# Patient Record
Sex: MALE | Race: WHITE
[De-identification: names, ages, dates, MRNs, and addresses within clinical notes are randomized per-mention and may not be internally consistent; named-entity substitution may affect disease eponyms.]

---

## 2019-11-25 ENCOUNTER — HOSPITAL ENCOUNTER (INPATIENT)
Dept: HOSPITAL 74 - JSAMEDAYSX | Age: 49
LOS: 9 days | Discharge: LEFT BEFORE DISCHARGE | DRG: 304 | End: 2019-12-04
Attending: ORTHOPAEDIC SURGERY | Admitting: ORTHOPAEDIC SURGERY
Payer: COMMERCIAL

## 2019-11-25 VITALS — BODY MASS INDEX: 25.8 KG/M2
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
## 2019-11-25 DIAGNOSIS — I97.621: ICD-10-CM

## 2019-11-25 DIAGNOSIS — M51.27: Primary | ICD-10-CM

## 2019-11-25 DIAGNOSIS — I95.9: ICD-10-CM

## 2019-11-25 DIAGNOSIS — R33.9: ICD-10-CM

## 2019-11-25 DIAGNOSIS — M53.2X7: ICD-10-CM

## 2019-11-25 DIAGNOSIS — I10: ICD-10-CM

## 2019-11-25 PROCEDURE — 0SB40ZZ EXCISION OF LUMBOSACRAL DISC, OPEN APPROACH: ICD-10-PCS | Performed by: ORTHOPAEDIC SURGERY

## 2019-11-25 PROCEDURE — 0SG3071 FUSION OF LUMBOSACRAL JOINT WITH AUTOLOGOUS TISSUE SUBSTITUTE, POSTERIOR APPROACH, POSTERIOR COLUMN, OPEN APPROACH: ICD-10-PCS | Performed by: ORTHOPAEDIC SURGERY

## 2019-11-25 PROCEDURE — 00NY0ZZ RELEASE LUMBAR SPINAL CORD, OPEN APPROACH: ICD-10-PCS | Performed by: ORTHOPAEDIC SURGERY

## 2019-11-25 PROCEDURE — 07DR3ZZ EXTRACTION OF ILIAC BONE MARROW, PERCUTANEOUS APPROACH: ICD-10-PCS | Performed by: ORTHOPAEDIC SURGERY

## 2019-11-25 PROCEDURE — 0SG30AJ FUSION OF LUMBOSACRAL JOINT WITH INTERBODY FUSION DEVICE, POSTERIOR APPROACH, ANTERIOR COLUMN, OPEN APPROACH: ICD-10-PCS | Performed by: ORTHOPAEDIC SURGERY

## 2019-11-25 RX ADMIN — GABAPENTIN SCH MG: 300 CAPSULE ORAL at 23:10

## 2019-11-25 RX ADMIN — ACETAMINOPHEN SCH MG: 10 INJECTION, SOLUTION INTRAVENOUS at 20:00

## 2019-11-25 RX ADMIN — SODIUM CHLORIDE, POTASSIUM CHLORIDE, SODIUM LACTATE AND CALCIUM CHLORIDE SCH MLS/HR: 600; 310; 30; 20 INJECTION, SOLUTION INTRAVENOUS at 18:15

## 2019-11-25 RX ADMIN — CEFAZOLIN SCH MLS/HR: 1 INJECTION, POWDER, FOR SOLUTION INTRAVENOUS at 23:11

## 2019-11-25 RX ADMIN — SODIUM CHLORIDE, POTASSIUM CHLORIDE, SODIUM LACTATE AND CALCIUM CHLORIDE SCH MLS/HR: 600; 310; 30; 20 INJECTION, SOLUTION INTRAVENOUS at 23:13

## 2019-11-25 NOTE — SURG
Surgery First Assist Note


First Assist: Myles Moise PA-C


Date of Service: 11/25/19


Diagnosis: 





1. 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  


2. L5-S1 spinal stenosis with neurogenic claudication.  


3. L5-S1 axial instability with myofascial pain complex.  Severity of illness: 4





Procedure: 





1. Bilateral laminectomies.  


2. Bilateral L5, S1 osteotomies (facetectomies).  


3. L5-S1 discectomy.  


4. L5-S1 posterior lumbar interbody fusion.  


5. L5-S1 insertion biomechanical device.  


6. L5-S1 posterior instrumentation.  


7. L5-S1 posterolateral arthrodesis.  


8. Bone allograft.  9. Bone autograft.  


10. Bone marrow aspiration.  


11. Complex wound closure (20cm).


I was present for the entirety of the operative procedure. For further detail, 

please refer to operative report.








Visit type





- Case Type


Case Type: Scheduled





- New patient


This patient is new to me today: Yes


Date on this admission: 11/25/19

## 2019-11-25 NOTE — OP
DATE OF OPERATION:  

 

DATE OF DICTATION:  11/25/2019

 

SURGEON:  Óscar Barber M.D. 

 

ASSISTANT:  KAYODE Khan. 

 

PREOPERATIVE DIAGNOSIS:  Disk prolapse, L5-S1, with radicular myelopathy and

segmental instability.  

 

POSTOPERATIVE DIAGNOSIS:  Disk prolapse, L5-S1, with radicular myelopathy and

segmental instability.  

 

OPERATION PERFORMED:

1.  Bilateral laminectomy, L5.  

2.  Partial laminectomy, L4 and S1.  

3.  Posterior lumbar interbody fusion with cage, L5-S1. 

4.  Anterior arthrodesis with bone grafting L5-S1.

5.  Pedicle screw instrumentation, L5-S1.

6.  Posterior lateral arthrodesis, L5-S1.

7.  Use of autologous bone graft expanded with allograft and bone marrow aspirate

concentrate. 

8.  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

 

ANESTHESIA:  RADHA Klein MD [id number]

DD: 11/25/2019 18:55

DT: 11/25/2019 22:07

Job #:  [id number]

## 2019-11-25 NOTE — PN
Progress Note (short form)





- Note


Progress Note: 





49M s/p L5-S1 posterior lumbar interbody fusion with instrumented postero-

lateral arthrodesis POD #0.





-Admit to ICU post-op.


-Pain control: NO NSAID's; patient received pre-op TLIP block w/Exparel; OK to 

use PCA if needed; transition to oral analgesia post-op.


-DVT PPx:


   -Mechanical only: CARRIE's, SCD's.


   -Chemical: None.


-Incentive spirometry q15 min.


-NPO until flatus.


-Ackerman care; d/c when ambulating.


-Post-op Ancef x 3 doses.


-PT/OT/Rehab, OOB.


-WBAT B/L LE.


-No bending, lifting (>5 lbs), or twisting for 9-12 months.


-Care per ICU & medical hospitalist teams.


-Discharge planning: f/u 7-10 days after rehab discharge at [facility] office; call for appointment; [phone].


-Will follow.





Óscar Barber MD (Orthopaedic Surgery).

## 2019-11-25 NOTE — OP
Operative Note





- Note:


Operative Date: 11/25/19


Pre-Operative Diagnosis: 1. L5-S1 intervertebral disc disorder with lower 

extremity radiculopathy.  2. L5-S1 spinal stenosis with neurogenic 

claudication.  3. L5-S1 axial instability with myofascial pain complex.  

Severity of illness: 4


Operation: 1. Bilateral laminectomies.  2. Bilateral L5, S1 osteotomies (

facetectomies).  3. L5-S1 discectomy.  4. L5-S1 posterior lumbar interbody 

fusion.  5. L5-S1 insertion biomechanical device.  6. L5-S1 posterior 

instrumentation.  7. L5-S1 posterolateral arthrodesis.  8. Bone allograft.  9. 

Bone autograft.  10. Bone marrow aspiration.  11. Complex wound closure (20cm).


Findings: 





R L5 conjoined nerve root


Implants: L5-S1 Cage: 11mm [id number].  Screws: 6.5mm x 40mm screws x 4


Post-Operative Diagnosis: Same as Pre-op


Surgeon: Óscar Barber


Assistant: Myles Moise


Anesthesiologist/CRNA: Zo Muir


Anesthesia: General


Specimens Removed: L5-S1 disc


Estimated Blood Loss (mls): 300


Drains & Tubes with Location: 1 x superficial HemoVac


Blood Volume Replaced (mls): 125 (Cell Saver)


Fluid Volume Replaced (mls): 2,000 (Crystalloid)


Operative Report Dictated: Yes

## 2019-11-26 LAB
ANION GAP SERPL CALC-SCNC: 7 MMOL/L (ref 8–16)
BUN SERPL-MCNC: 18.5 MG/DL (ref 7–18)
CALCIUM SERPL-MCNC: 8.8 MG/DL (ref 8.5–10.1)
CHLORIDE SERPL-SCNC: 106 MMOL/L (ref 98–107)
CO2 SERPL-SCNC: 25 MMOL/L (ref 21–32)
CREAT SERPL-MCNC: 1 MG/DL (ref 0.55–1.3)
DEPRECATED RDW RBC AUTO: 12.5 % (ref 11.9–15.9)
GLUCOSE SERPL-MCNC: 105 MG/DL (ref 74–106)
HCT VFR BLD CALC: 35.3 % (ref 35.4–49)
HGB BLD-MCNC: 12.2 GM/DL (ref 11.7–16.9)
MCH RBC QN AUTO: 32.6 PG (ref 25.7–33.7)
MCHC RBC AUTO-ENTMCNC: 34.5 G/DL (ref 32–35.9)
MCV RBC: 94.7 FL (ref 80–96)
PLATELET # BLD AUTO: 228 K/MM3 (ref 134–434)
PMV BLD: 8.4 FL (ref 7.5–11.1)
POTASSIUM SERPLBLD-SCNC: 4 MMOL/L (ref 3.5–5.1)
RBC # BLD AUTO: 3.72 M/MM3 (ref 4–5.6)
SODIUM SERPL-SCNC: 138 MMOL/L (ref 136–145)
WBC # BLD AUTO: 16.4 K/MM3 (ref 4–10)

## 2019-11-26 RX ADMIN — SODIUM CHLORIDE, POTASSIUM CHLORIDE, SODIUM LACTATE AND CALCIUM CHLORIDE SCH: 600; 310; 30; 20 INJECTION, SOLUTION INTRAVENOUS at 18:25

## 2019-11-26 RX ADMIN — CEFAZOLIN SCH MLS/HR: 1 INJECTION, POWDER, FOR SOLUTION INTRAVENOUS at 06:22

## 2019-11-26 RX ADMIN — CEFAZOLIN SCH MLS/HR: 1 INJECTION, POWDER, FOR SOLUTION INTRAVENOUS at 17:18

## 2019-11-26 RX ADMIN — ACETAMINOPHEN SCH MG: 10 INJECTION, SOLUTION INTRAVENOUS at 06:21

## 2019-11-26 RX ADMIN — SODIUM CHLORIDE, POTASSIUM CHLORIDE, SODIUM LACTATE AND CALCIUM CHLORIDE SCH MLS/HR: 600; 310; 30; 20 INJECTION, SOLUTION INTRAVENOUS at 15:38

## 2019-11-26 RX ADMIN — CEFAZOLIN SCH MLS/HR: 1 INJECTION, POWDER, FOR SOLUTION INTRAVENOUS at 11:52

## 2019-11-26 RX ADMIN — SODIUM CHLORIDE, POTASSIUM CHLORIDE, SODIUM LACTATE AND CALCIUM CHLORIDE SCH: 600; 310; 30; 20 INJECTION, SOLUTION INTRAVENOUS at 21:36

## 2019-11-26 RX ADMIN — GABAPENTIN SCH MG: 300 CAPSULE ORAL at 10:23

## 2019-11-26 RX ADMIN — CEFAZOLIN SCH: 1 INJECTION, POWDER, FOR SOLUTION INTRAVENOUS at 06:30

## 2019-11-26 RX ADMIN — GABAPENTIN SCH MG: 300 CAPSULE ORAL at 21:46

## 2019-11-26 RX ADMIN — SODIUM CHLORIDE, POTASSIUM CHLORIDE, SODIUM LACTATE AND CALCIUM CHLORIDE SCH: 600; 310; 30; 20 INJECTION, SOLUTION INTRAVENOUS at 01:40

## 2019-11-26 NOTE — PN
Progress Note (short form)





- Note


Progress Note: 


Anesthesia Post Op Note


Pt seen s/p GA for  lumbar lami


Pt awake alert in NAD, denies nausea/vomiting


denies puritis 


lama in situ to ambulate this am


pain control acceptable


VSS


no apparent anesthesia complications


BRICE Ladd.

## 2019-11-26 NOTE — PN
Progress Note (short form)





- Note


Progress Note: 











POD 1, s/p Bilateral L5, S1 Lami/osteotomies/discectomy/fusion.  











Pt seen and examined. Reports lower back pain radiating into his L thigh (sxs 

prior to sx), slightly improved with pain regimen. Tolerating PO. Has been oob 

standing, however reports pain is severe with movement. Ackerman in place. Denies 

cp/sob, n/v/d, motor/sensory deficits. 














 Vital Signs











Temp  99.2 F   11/26/19 06:00


 


Pulse  82   11/26/19 06:00


 


Resp  20   11/26/19 06:00


 


BP  102/46 L  11/26/19 06:00


 


Pulse Ox  96   11/25/19 20:45








 Intake & Output











 11/25/19 11/25/19 11/26/19





 11:59 23:59 11:59


 


Intake Total  2525 325


 


Output Total  1450 600


 


Balance  1075 -275


 


Weight 175 lb  


 


Intake:   


 


  IV  2400 225


 


    Lactated Ringers Solution   225





    1,000 ml @ 75 mls/hr IV   





    ASDIR KERLINE Rx#:[id number]   


 


  IVPB   100


 


  Blood Product  125 


 


Output:   


 


  Urine  1100 600


 


    Ackerman   600


 


  Estimated Blood Loss  350 


 


Other:   


 


  Voiding Method  Indwelling Catheter 


 


  Bowel Movement   No


 


  Height 5 ft 9 in  


 


  Body Mass Index (BMI) 25.8  


 


  Weight Measurement Method Stated by Patient  








 CBC, BMP





 11/26/19 07:10 





 11/26/19 07:10 














Gen: awake, alert, nad


Resp: Unlabored on RA


Neuro: B/L le 5/5 dorsi/plantarflexion, SILT b/l les. Unable to lift legs 

antigravity due to pain, however visualized pt standing at edge of bed without 

issue. 


Back: Dressing saturated with blood, removed incision c/d/i with staples in 

place, no active drainage visualized. New 4x4s and tegaderms applied. 








A/P: 50 y/o M w/ HTN, Back pain/radiculopathy, now POD 1, s/p bilateral L5, S1 

Lami/osteotomies/discectomy/fusion.  








afebrile, slightly hypotensive overnight (systolic 90s)








-Pain control with Oxy 5/10mg q4hrs prn, ofirmev 1G q6hrs scheduled, gabapentin 

600mg BID


-OOB with PT


-Ackerman to be d/c'ed at 1400


-VS per protocol


-Neuro checks per protocol


-Keep dressing c/d/i


-Reg diet





message sent to Dr Barber regarding above

## 2019-11-27 RX ADMIN — GABAPENTIN SCH MG: 300 CAPSULE ORAL at 21:21

## 2019-11-27 RX ADMIN — GABAPENTIN SCH MG: 300 CAPSULE ORAL at 09:33

## 2019-11-27 RX ADMIN — SODIUM CHLORIDE, POTASSIUM CHLORIDE, SODIUM LACTATE AND CALCIUM CHLORIDE SCH: 600; 310; 30; 20 INJECTION, SOLUTION INTRAVENOUS at 21:22

## 2019-11-27 RX ADMIN — LISINOPRIL SCH MG: 5 TABLET ORAL at 09:34

## 2019-11-27 RX ADMIN — SODIUM CHLORIDE, POTASSIUM CHLORIDE, SODIUM LACTATE AND CALCIUM CHLORIDE SCH MLS/HR: 600; 310; 30; 20 INJECTION, SOLUTION INTRAVENOUS at 02:37

## 2019-11-27 NOTE — PN
Progress Note (short form)





- Note


Progress Note: 





ORTHOPAEDIC SPINE SURGERY





POD #2 s/p Bilateral L5, S1 Lami/osteotomies/discectomy/fusion.  








Pt seen and examined at bedside.


States only OOB and able to stand for a short bit (secondary to significant 

pain related to surgical procedure)


He is tolerating PO diet. Lama in place. Denies n/v/f/c, cp, palpitations, sob 

or zhao. Denies motor or sensory deficits. 





 Last Vital Signs











Temp Pulse Resp BP Pulse Ox


 


 98.8 F   88   20   134/60   92 L


 


 11/27/19 07:00  11/27/19 07:00  11/27/19 07:00  11/27/19 07:00  11/26/19 21:00














PE


Gen: awake, alert, nad


Resp: Unlabored on RA


Neuro: B/L le 5/5 dorsi/plantarflexion, SILT b/l les


Back: c/d/i with staples insitu, no hematoma/erythema or warmth (no signs of 

infection). 

















Problem List





- Problems


(1) Chronic low back pain


Assessment/Plan: 


A/P: 50 y/o M POD #2 s/p Bilateral L5, S1 Lami/osteotomies/discectomy/fusion. 








-Pain control with Oxy 5/10mg q4hrs prn, ofirmev 1G q6hrs scheduled, gabapentin 

600mg BID


-OOB with PT


-DC lama once OOB and able to ambulate


-VS per protocol


-Neuro checks per protocol


-Reg diet


-Dressing changed on rounds


-DVT ppx


Code(s): M54.5 - LOW BACK PAIN; G89.29 - OTHER CHRONIC PAIN

## 2019-11-27 NOTE — PN
Progress Note (short form)





- Note


Progress Note: 





49M s/p L5-S1 posterior lumbar interbody fusion with instrumented postero-

lateral arthrodesis POD #2.





Pain well controlled; Original left leg pain completely resolved. No acute 

events overnight.


Pt. denies overnight history of headaches, chest pain, shortness of breath, 

nausea, vomiting, chills, & sweats.


(+) Ackerman; (-) Flatus; (-) BM.





Walked in hallway.





All labs and vitals reviewed.





PE: AAO x 3, NAD.


T/L-Spine: Incision, dressing C/D/I.


   B/L LE M: L2-S1 intact, at baseline.


   B/L LE S: L2-S1 2/2.





49M s/p L5-S1 posterior lumbar interbody fusion with instrumented postero-

lateral arthrodesis POD #2.


-Admit to ICU post-op.


-Pain control: NO NSAID's.


-DVT PPx:


   -Mechanical only: CARRIE's, SCD's.


   -Chemical: None.


-Incentive spirometry q15 min.


-NPO until flatus.


-Ackerman care; d/c when ambulating.


-PT/OT/Rehab, OOB.


-WBAT B/L LE.


-No bending, lifting (>5 lbs), or twisting for 9-12 months.


-Care per medical hospitalist teams.


-f/u pain management rec's: Dr. Brennon Theodore (case discussed).


-Discharge planning: f/u 7-10 days after rehab discharge at [facility] office; call for appointment; [phone].


-Will follow.





Óscar Barber MD (Orthopaedic Surgery).

## 2019-11-28 RX ADMIN — OXYCODONE HYDROCHLORIDE SCH MG: 10 TABLET, FILM COATED, EXTENDED RELEASE ORAL at 12:16

## 2019-11-28 RX ADMIN — ACETAMINOPHEN PRN MG: 325 TABLET ORAL at 17:36

## 2019-11-28 RX ADMIN — GABAPENTIN SCH MG: 300 CAPSULE ORAL at 21:27

## 2019-11-28 RX ADMIN — GABAPENTIN SCH MG: 300 CAPSULE ORAL at 09:38

## 2019-11-28 RX ADMIN — OXYCODONE HYDROCHLORIDE SCH MG: 10 TABLET, FILM COATED, EXTENDED RELEASE ORAL at 21:28

## 2019-11-28 RX ADMIN — METHYLNALTREXONE BROMIDE SCH MG: 12 INJECTION, SOLUTION SUBCUTANEOUS at 15:12

## 2019-11-28 RX ADMIN — LISINOPRIL SCH MG: 5 TABLET ORAL at 09:39

## 2019-11-29 LAB
BASOPHILS # BLD: 0.6 % (ref 0–2)
DEPRECATED RDW RBC AUTO: 12.2 % (ref 11.9–15.9)
EOSINOPHIL # BLD: 2.4 % (ref 0–4.5)
HCT VFR BLD CALC: 33.6 % (ref 35.4–49)
HGB BLD-MCNC: 11.5 GM/DL (ref 11.7–16.9)
LYMPHOCYTES # BLD: 16.4 % (ref 8–40)
MCH RBC QN AUTO: 32.5 PG (ref 25.7–33.7)
MCHC RBC AUTO-ENTMCNC: 34.1 G/DL (ref 32–35.9)
MCV RBC: 95.3 FL (ref 80–96)
MONOCYTES # BLD AUTO: 9.6 % (ref 3.8–10.2)
NEUTROPHILS # BLD: 71 % (ref 42.8–82.8)
PLATELET # BLD AUTO: 247 K/MM3 (ref 134–434)
PMV BLD: 8.3 FL (ref 7.5–11.1)
RBC # BLD AUTO: 3.53 M/MM3 (ref 4–5.6)
WBC # BLD AUTO: 11.3 K/MM3 (ref 4–10)

## 2019-11-29 RX ADMIN — TAMSULOSIN HYDROCHLORIDE SCH MG: 0.4 CAPSULE ORAL at 11:56

## 2019-11-29 RX ADMIN — OXYCODONE HYDROCHLORIDE SCH MG: 10 TABLET, FILM COATED, EXTENDED RELEASE ORAL at 09:10

## 2019-11-29 RX ADMIN — LISINOPRIL SCH MG: 5 TABLET ORAL at 09:10

## 2019-11-29 RX ADMIN — SODIUM CHLORIDE, POTASSIUM CHLORIDE, SODIUM LACTATE AND CALCIUM CHLORIDE SCH MLS/HR: 600; 310; 30; 20 INJECTION, SOLUTION INTRAVENOUS at 00:02

## 2019-11-29 RX ADMIN — METHYLNALTREXONE BROMIDE SCH MG: 12 INJECTION, SOLUTION SUBCUTANEOUS at 10:13

## 2019-11-29 RX ADMIN — GABAPENTIN SCH MG: 300 CAPSULE ORAL at 21:54

## 2019-11-29 RX ADMIN — AMOXICILLIN AND CLAVULANATE POTASSIUM SCH TAB: 875; 125 TABLET, FILM COATED ORAL at 17:49

## 2019-11-29 RX ADMIN — ACETAMINOPHEN PRN MG: 325 TABLET ORAL at 03:57

## 2019-11-29 RX ADMIN — GABAPENTIN SCH MG: 300 CAPSULE ORAL at 09:10

## 2019-11-29 NOTE — PN
Progress Note (short form)





- Note


Progress Note: 














POD 4, s/p Bilateral L5, S1 Lami/osteotomies/discectomy/fusion.  











Pt seen, refused exam. Pt very upset this morning, reports his pain is not 

controlled. Shouting about lama placement last night, states it was traumatic 

and painful. Continues to have difficulty walking and standing. Refusing to 

discuss discharge or rehab placement. 











               


 Vital Signs











Temp  98.6 F   11/29/19 07:58


 


Pulse  68   11/29/19 07:58


 


Resp  20   11/29/19 07:58


 


BP  109/72   11/29/19 07:58


 


Pulse Ox  95   11/28/19 21:00








 Intake & Output











 11/28/19 11/28/19 11/29/19





 11:59 23:59 11:59


 


Intake Total  300 


 


Output Total 200 290 700


 


Balance -200 10 -700


 


Intake:   


 


  Oral  300 


 


Output:   


 


  Urine 200 290 700


 


    Lama 200 290 700


 


Other:   


 


  Voiding Method Indwelling Catheter Indwelling Catheter 








 CBC, BMP





 11/26/19 07:10 





 11/26/19 07:10 














PE: Pt refused neuro exam, visualized pt standing and ambulating next to bed 

using cane


Allowed dressing change, wound c/d/i with staples in place, no erythema or 

drainage. New 4x4s and tegaderms applied. 





A/P: 50 y/o M w/ HTN, Back pain/radiculopathy, now POD 4, s/p bilateral L5, S1 

Lami/osteotomies/discectomy/fusion.  








Febrile yesterday, tmax 102.1, last labs 11/26


Lama reinserted yesterday for retention (per pt no prior issues with urinary 

retention or known prostate disease)





-Flomax ordered for urinary retention, consult pending for Urology


-Pain control


-OOB with PT


-VS per protocol


-Neuro checks per protocol


-Keep dressing c/d/i


-Reg diet





above d/w attending Dr Barber

## 2019-11-29 NOTE — PN
Progress Note (short form)





- Note


Progress Note: 





C/O incisional; pain


Apyrexial


Wound inspection    Pristine  dry


Neuro   At baseline Walking in the hallway


Urinary retension   Catheter in situ


         Awaiting urology consult





ASSESS   Doing well


      Problems   Urinary retension


         Incisional pain


PLAN   Augmenten


      Mobilize   


      Urology assessment


      pain mx

## 2019-11-30 LAB
APPEARANCE UR: CLEAR
BACTERIA # UR AUTO: 1.5 /HPF
BILIRUB UR STRIP.AUTO-MCNC: NEGATIVE MG/DL
CASTS URNS QL MICRO: 4 /LPF (ref 0–8)
COLOR UR: YELLOW
EPITH CASTS URNS QL MICRO: 0.7 /HPF
KETONES UR QL STRIP: (no result)
LEUKOCYTE ESTERASE UR QL STRIP.AUTO: (no result)
NITRITE UR QL STRIP: NEGATIVE
PH UR: 6 [PH] (ref 5–8)
PROT UR QL STRIP: (no result)
PROT UR QL STRIP: NEGATIVE
RBC # BLD AUTO: 93 /HPF (ref 0–4)
SP GR UR: 1.03 (ref 1.01–1.03)
UROBILINOGEN UR STRIP-MCNC: 1 MG/DL (ref 0.2–1)
WBC # UR AUTO: 4 /HPF (ref 0–5)

## 2019-11-30 RX ADMIN — METHYLNALTREXONE BROMIDE SCH MG: 12 INJECTION, SOLUTION SUBCUTANEOUS at 13:30

## 2019-11-30 RX ADMIN — AMOXICILLIN AND CLAVULANATE POTASSIUM SCH TAB: 875; 125 TABLET, FILM COATED ORAL at 08:50

## 2019-11-30 RX ADMIN — SODIUM CHLORIDE, POTASSIUM CHLORIDE, SODIUM LACTATE AND CALCIUM CHLORIDE SCH: 600; 310; 30; 20 INJECTION, SOLUTION INTRAVENOUS at 18:40

## 2019-11-30 RX ADMIN — LISINOPRIL SCH: 5 TABLET ORAL at 09:01

## 2019-11-30 RX ADMIN — GABAPENTIN SCH MG: 300 CAPSULE ORAL at 21:50

## 2019-11-30 RX ADMIN — GABAPENTIN SCH MG: 300 CAPSULE ORAL at 09:01

## 2019-11-30 RX ADMIN — ACETAMINOPHEN PRN MG: 325 TABLET ORAL at 06:42

## 2019-11-30 RX ADMIN — AMOXICILLIN AND CLAVULANATE POTASSIUM SCH TAB: 875; 125 TABLET, FILM COATED ORAL at 17:39

## 2019-11-30 RX ADMIN — TAMSULOSIN HYDROCHLORIDE SCH MG: 0.4 CAPSULE ORAL at 08:50

## 2019-11-30 NOTE — PN
Progress Note (short form)





- Note


Progress Note: 





POD#5


C/O Right radiculopathic pain   LE  minimal LBP   No weakness No dysesthesia


Ambulating   Difficulty due to pain


Vitals   Stable


CVS   Stable\


RESP   AE equal  No adventitious sounds


ABD   Soft  Ackerman in situ   Awaiting Urology consult


Neuro   At baseline


Mskeletal   Bandage dry  Mild local tenderness





ASSESS   Post L5 S1 PLIF   Intraop op tension on conjoint nerve root





PLAN   CT scan lspine to evaluate canal

## 2019-12-01 RX ADMIN — GABAPENTIN SCH MG: 300 CAPSULE ORAL at 09:15

## 2019-12-01 RX ADMIN — TAMSULOSIN HYDROCHLORIDE SCH MG: 0.4 CAPSULE ORAL at 08:45

## 2019-12-01 RX ADMIN — GABAPENTIN SCH MG: 300 CAPSULE ORAL at 21:32

## 2019-12-01 RX ADMIN — METHYLNALTREXONE BROMIDE SCH: 12 INJECTION, SOLUTION SUBCUTANEOUS at 12:48

## 2019-12-01 RX ADMIN — AMOXICILLIN AND CLAVULANATE POTASSIUM SCH TAB: 875; 125 TABLET, FILM COATED ORAL at 18:04

## 2019-12-01 RX ADMIN — AMOXICILLIN AND CLAVULANATE POTASSIUM SCH TAB: 875; 125 TABLET, FILM COATED ORAL at 08:45

## 2019-12-01 RX ADMIN — LISINOPRIL SCH MG: 5 TABLET ORAL at 09:15

## 2019-12-02 RX ADMIN — GABAPENTIN SCH MG: 300 CAPSULE ORAL at 10:08

## 2019-12-02 RX ADMIN — GABAPENTIN SCH MG: 300 CAPSULE ORAL at 21:00

## 2019-12-02 RX ADMIN — TAMSULOSIN HYDROCHLORIDE SCH MG: 0.4 CAPSULE ORAL at 07:33

## 2019-12-02 RX ADMIN — ACETAMINOPHEN PRN MG: 325 TABLET ORAL at 15:50

## 2019-12-02 RX ADMIN — SODIUM CHLORIDE, POTASSIUM CHLORIDE, SODIUM LACTATE AND CALCIUM CHLORIDE SCH: 600; 310; 30; 20 INJECTION, SOLUTION INTRAVENOUS at 02:19

## 2019-12-02 RX ADMIN — LISINOPRIL SCH: 5 TABLET ORAL at 10:09

## 2019-12-02 RX ADMIN — AMOXICILLIN AND CLAVULANATE POTASSIUM SCH TAB: 875; 125 TABLET, FILM COATED ORAL at 07:33

## 2019-12-02 RX ADMIN — METHYLNALTREXONE BROMIDE SCH: 12 INJECTION, SOLUTION SUBCUTANEOUS at 10:09

## 2019-12-02 NOTE — PN
Progress Note (short form)





- Note


Progress Note: 





49M s/p L5-S1 posterior lumbar interbody fusion with instrumented postero-

lateral arthrodesis POD #7.





Pain well controlled; Original left leg pain completely resolved. Pt. now 

reports RLE radiculopathic pain in L5 pattern distribution.


Pt. denies overnight history of headaches, chest pain, shortness of breath, 

nausea, vomiting, chills, & sweats.


(+) Voiding; (+) Flatus; (-) BM.





Walked in hallway.





All labs and vitals reviewed.





PE: AAO x 3, NAD.


T/L-Spine: Incision, dressing w/serosanguinous discharge.


   B/L LE M: L2-S1 intact, at baseline.


   B/L LE S: L2-L4, S1 2/2. LLE 2/2. RLE 1/2.





CT L-Spine: L5-S1 hardware intact & in place. Slight medial breach of bilateral 

L5 screws.





49M s/p L5-S1 posterior lumbar interbody fusion with instrumented postero-

lateral arthrodesis POD #7.


-NPO, IVF.


-Plan to return to OR today for I&D lumbar spine with possible revision of 

hardware.


-Pain control: NO NSAID's.


-DVT PPx:


   -Mechanical only: CARRIE's, SCD's.


   -Chemical: None.


-Incentive spirometry q15 min.


-PT/OT/Rehab, OOB.


-WBAT B/L LE.


-No bending, lifting (>5 lbs), or twisting for 9-12 months.


-Care per medical hospitalist & urology teams.


-Discharge planning: f/u 7-10 days after rehab discharge at [facility] office; call for appointment; [phone].


-Will follow.





Óscar Barber MD (Orthopaedic Surgery).

## 2019-12-03 PROCEDURE — 4A1004G MONITORING OF CENTRAL NERVOUS ELECTRICAL ACTIVITY, INTRAOPERATIVE, OPEN APPROACH: ICD-10-PCS | Performed by: ORTHOPAEDIC SURGERY

## 2019-12-03 PROCEDURE — 0SP004Z REMOVAL OF INTERNAL FIXATION DEVICE FROM LUMBAR VERTEBRAL JOINT, OPEN APPROACH: ICD-10-PCS | Performed by: ORTHOPAEDIC SURGERY

## 2019-12-03 PROCEDURE — 0S920ZZ DRAINAGE OF LUMBAR VERTEBRAL DISC, OPEN APPROACH: ICD-10-PCS | Performed by: ORTHOPAEDIC SURGERY

## 2019-12-03 PROCEDURE — B01BZZZ FLUOROSCOPY OF SPINAL CORD: ICD-10-PCS | Performed by: ORTHOPAEDIC SURGERY

## 2019-12-03 PROCEDURE — 0QH004Z INSERTION OF INTERNAL FIXATION DEVICE INTO LUMBAR VERTEBRA, OPEN APPROACH: ICD-10-PCS | Performed by: ORTHOPAEDIC SURGERY

## 2019-12-03 RX ADMIN — AMOXICILLIN AND CLAVULANATE POTASSIUM SCH TAB: 875; 125 TABLET, FILM COATED ORAL at 21:50

## 2019-12-03 RX ADMIN — AMOXICILLIN AND CLAVULANATE POTASSIUM SCH TAB: 875; 125 TABLET, FILM COATED ORAL at 13:34

## 2019-12-03 RX ADMIN — TAMSULOSIN HYDROCHLORIDE SCH MG: 0.4 CAPSULE ORAL at 10:31

## 2019-12-03 RX ADMIN — LISINOPRIL SCH MG: 5 TABLET ORAL at 10:30

## 2019-12-03 RX ADMIN — METHYLNALTREXONE BROMIDE SCH: 12 INJECTION, SOLUTION SUBCUTANEOUS at 10:23

## 2019-12-03 RX ADMIN — GABAPENTIN SCH MG: 300 CAPSULE ORAL at 10:31

## 2019-12-03 RX ADMIN — ACETAMINOPHEN PRN MG: 325 TABLET ORAL at 18:45

## 2019-12-03 RX ADMIN — GABAPENTIN SCH MG: 300 CAPSULE ORAL at 21:50

## 2019-12-03 RX ADMIN — CEFAZOLIN SODIUM SCH MLS/HR: 2 SOLUTION INTRAVENOUS at 02:59

## 2019-12-03 RX ADMIN — CEFAZOLIN SODIUM SCH: 2 SOLUTION INTRAVENOUS at 10:23

## 2019-12-03 NOTE — PN
Progress Note (short form)





- Note


Progress Note: 





49M s/p L5-S1 posterior lumbar interbody fusion with instrumented postero-

lateral arthrodesis POD #8, I&D retained wound hematoma with revision of 

hardware POD #0.





Pt. reports (+) incisional pain.


Total resolution of RLE radiculopathy.


Pt. denies overnight history of headaches, chest pain, shortness of breath, 

nausea, vomiting, chills, & sweats.


(+) Ackerman; (-) Flatus; (-) BM.





(+) Walked in room and hallway today.





All labs and vitals reviewed.





PE: AAO x 3, NAD.


L-Spine: Incision, dressing C/D/I.


   B/L LE NV status at baseline.





49M s/p L5-S1 posterior lumbar interbody fusion with instrumented postero-

lateral arthrodesis POD #8, I&D retained wound hematoma with revision of 

hardware POD #0.





-Pain control: NO NSAID's; OK to use PCA if needed; transition to oral 

analgesia post-op.


-DVT PPx:


   -Mechanical only: CARRIE's, SCD's.


   -Chemical: None.


-Incentive spirometry q15 min.


-Diet as tolerated.


-Ackerman care; d/c at midnight; f/u TOV (8 hours max).


-Post-op Ancef x 3 doses.


-PT/OT/Rehab, OOB.


-WBAT B/L LE.


-No bending, lifting (>5 lbs), or twisting for 9-12 months.


-Care per ICU & medical hospitalist teams.


-Discharge planning: f/u 7-10 days after rehab discharge at [facility]; call for appointment; [phone].


-Will follow.





Óscar Barber MD (Orthopaedic Surgery).

## 2019-12-03 NOTE — PN
Progress Note (short form)





- Note


Progress Note: 





49M s/p L5-S1 posterior lumbar interbody fusion with instrumented postero-

lateral arthrodesis POD #7 now s/p evacuation of lumbar spine hematoma, removal 

of symptomatic hardware, & replacement of bilateral L5 screws POD #0.





-Admit to ICU post-op.


-Pain control: NO NSAID's; OK to use PCA if needed; transition to oral 

analgesia post-op.


-DVT PPx:


   -Mechanical only: CARRIE's, SCD's.


   -Chemical: None.


-Incentive spirometry q15 min.


-NPO until flatus.


-Ackerman care; d/c when ambulating.


-Post-op Ancef x 3 doses.


-PT/OT/Rehab, OOB.


-WBAT B/L LE.


-No bending, lifting (>5 lbs), or twisting for 9-12 months.


-Care per ICU & medical hospitalist teams.


-Discharge planning: f/u 7-10 days after rehab discharge at [facility] office; call for appointment; [phone].


-Will follow.





Óscar Barber MD (Orthopaedic Surgery).

## 2019-12-03 NOTE — OP
Operative Note





- Note:


Operative Date: 12/03/19


Pre-Operative Diagnosis: 1. Lumbar spine retained hematoma.  2. Symptomatic 

hardware lumbar spine


Operation: 1. I&D lumbar spine with evacuation of retained hematoma.  2. 

Removal of hardware and replacement (bilateral L5 screws, bilateral rods).  3. 

Reinstrumentation bilateral L5 screws, rods.


Findings: 





1. Retained hematoma


2. No sign of infection


3. Improved B/L LE EMG/NCV's after replacement of bilateral L5 screws


Post-Operative Diagnosis: Same as Pre-op


Surgeon: Óscar Barber


Assistant: Tucker Barber


Anesthesiologist/CRNA: Leodan Sung


Anesthesia: General


Specimens Removed: Lumbar spine hardware


Estimated Blood Loss (mls): 100


Drains & Tubes with Location: 1 x deep Hematoma


Fluid Volume Replaced (mls): 1,000 (Crystalloid)


Operative Report Dictated: Yes

## 2019-12-03 NOTE — OP
DATE OF OPERATION:  

 

DATE OF DICTATION:  12/03/2019

 

SURGEON:  Óscar Barber MD 

 

ASSISTANT:  Tucker Barber MD

 

PREOPERATIVE DIAGNOSIS:  Retained hematoma, lumbar spine with questionable placement

of hardware, i.e. L5 pedicle screws appear to be malpositioned medially as per CT

scan and onset of recent new right L5-S1 radiculopathic pain.

 

POSTOPERATIVE DIAGNOSIS:  Retained hematoma, lumbar spine with questionable placement

of hardware, i.e. L5 pedicle screws appear to be malpositioned medially as per CT

scan and onset of recent new right L5-S1 radiculopathic pain.

 

OPERATIONS PERFORMED:

1.  Incision and drainage.

2.  Removal of L5 screws, repositioning of L5 screws, and revision instrumentation.

 

ANESTHESIA:  General.

 

ANTIBIOTICS GIVEN:  2 g of Kefzol, 1 g vancomycin, 1 g vancomycin placed as a power

in the deep subfascial layer of the wound and in the superficial layer of the wound. 

 

BLOOD LOSS:  Approximately 200 mL.

 

OPERATION DETAILS:  Patient correctly identified.  Brought to the operating room. 

Lumbar spine was prepped and draped in the routine manner with Betadine scrub

solution, wiped off with alcohol, DuraPrep applied.  The problem we encountered here

was persistent drainage of the wound in presence of a gentleman with ongoing,

persistent, right-sided radiculopathic leg ache.  Patient is motor and sensory fully

intact, but radiculopathic pain necessitated a CT scan postoperatively.  This

revealed the presence of the screws in situ.  The concern was the possibility of

cutting of a corner in the recess of both the left and right hand screws, and we

elected at the time of incision and drainage and washout of this hematoma that I

would go ahead and reposition the screws accordingly.  Midline incision all the

sutures were removed.  The tissues were healthy.  A large, retained hematoma of

approximately 100 mL was noted.  Dark blood in the subfascial space.  Neuromonitoring

was performed throughout the procedure.  The neuromonitoring revealed the presence of

marked improvement following this operation.  The caps, rods were removed on both

sides.  The pedicle screw of L5 was removed under lateral fluoroscopic guidance, and

using anatomic guidelines, the tissues were opened appropriately, and brand new drill

holes were applied.  The drill was placed lateral to the superior facet at this

juncture of the superior facet and the transverse process.  A ball tip feeler

palpated the bone bed and found to be completely 360-degree solid bone throughout the

pedicles both left and right hand side.  Size 40 x 6.5 new screws were placed.  A

left-sided 45-mm screw placed and a right-sided 15-mm magno placed.  The rods were

contoured, appropriately tightened.  Prior to magno insertion, the screws were

subjected to electrophoretic stimulation and found to be at 20 mA, well above the

safe zone.  Once the appropriate caps were tightened, the rods now positioned well. 

The tissues were thoroughly lavaged again with saline.  The closure was as follows: 

Fascia 1 Vicryl, subcutaneous 1 and 2-0 Vicryl, skin staples.  Drainage 1/8 Hemovac

in the subfascial plane x1.  Vancomycin powder was placed on the subfascial plane and

in the superficial tissues as well.  No complications.  A bulky dressing applied.

 

 

MD JOHN Rowe/[id number]

DD: 12/03/2019 00:58

DT: 12/03/2019 10:44

Job #:  [id number]

## 2019-12-04 VITALS — SYSTOLIC BLOOD PRESSURE: 88 MMHG | HEART RATE: 92 BPM | TEMPERATURE: 99.3 F | DIASTOLIC BLOOD PRESSURE: 43 MMHG

## 2019-12-04 RX ADMIN — ACETAMINOPHEN PRN MG: 325 TABLET ORAL at 14:45

## 2019-12-04 RX ADMIN — LISINOPRIL SCH MG: 5 TABLET ORAL at 10:19

## 2019-12-04 RX ADMIN — TAMSULOSIN HYDROCHLORIDE SCH MG: 0.4 CAPSULE ORAL at 10:19

## 2019-12-04 RX ADMIN — AMOXICILLIN AND CLAVULANATE POTASSIUM SCH TAB: 875; 125 TABLET, FILM COATED ORAL at 10:21

## 2019-12-04 RX ADMIN — METHYLNALTREXONE BROMIDE SCH MG: 12 INJECTION, SOLUTION SUBCUTANEOUS at 10:19

## 2019-12-04 RX ADMIN — GABAPENTIN SCH MG: 300 CAPSULE ORAL at 10:19

## 2019-12-04 RX ADMIN — AMOXICILLIN AND CLAVULANATE POTASSIUM SCH TAB: 875; 125 TABLET, FILM COATED ORAL at 17:36

## 2019-12-04 NOTE — PN
Progress Note (short form)





- Note


Progress Note: 





49M s/p L5-S1 posterior lumbar interbody fusion with instrumented postero-

lateral arthrodesis POD #8, I&D retained wound hematoma with revision of 

hardware POD #2.





All labs and vitals reviewed.





(+) Independent walking in hallway.


Patient randomly, incidentally encountered at hospital entrance. As per security

, patient is leaving the hospital against medical advice (AMA). Sister is 

picking him up.


This is against the advice of myself and the consulting medical hospitalist 

team.





I have encouraged Taco to see me in the office within the next 7-10 days for 

routine aftercare.


Taco, however, repeatedly expresses discontent, will not answer my routine 

medical assessment questions, and continuously threatens legal action.


Despite this combative behavior, I have explained that his post-operative back 

pain is normal for this time point following his surgery. His early return to 

independent function is reflective of the fact that he is recovering well. His 

orthopaedic/spine problem is under control.





I have tried to discuss recommended aftercare, but Taco refuses to listen.





-Pain control: NO NSAID's; oral meds.


-DVT PPx:


   -Mechanical only: CARRIE's, SCD's.


   -Chemical: None.


-Incentive spirometry q15 min.


-Diet as tolerated.


-PT/OT/Rehab, OOB.


-WBAT B/L LE.


-No bending, lifting (>5 lbs), or twisting for 9-12 months.


-Care per medical hospitalist teams.


-Discharge planning: f/u 7-10 days after rehab discharge at [facility] office; call for appointment; [phone].


-Will follow.





Óscar Barber MD (Orthopaedic Surgery).

## 2019-12-04 NOTE — PN
Progress Note (short form)





- Note


Progress Note: 





Anesthesia/ postop pain management follow up


50 y/o M on PCA for postop pain management. Walking in the hallway, PCA was 

discontinued yesterday afternoon.